# Patient Record
(demographics unavailable — no encounter records)

---

## 2024-11-01 NOTE — DISCUSSION/SUMMARY
[de-identified] : Left knee osteoarthritis with acute exacerbation.  I discussed the treatment of degenerative arthritis with the patient at length today. I described the spectrum of treatment from nonoperative modalities to total joint arthroplasty. Noninvasive and nonoperative treatment modalities include weight reduction, activity modification with low impact exercise, PRN use of acetaminophen or anti-inflammatory medication if tolerated, glucosamine/chondroitin supplements, and physical therapy. Further treatments can include corticosteroid injection and the use of hyaluronic acid injections. Definitive treatment can certainly include total joint arthroplasty also. The risks and benefits of each treatment options was discussed and all questions were answered. The patient is indicated for total knee arthroplasty. We discussed the surgery postoperative protocol restrictions in length. Risks benefits alternatives of surgery discussed including but not limited to risks such as bleeding infection nerve and vessel injury continued pain stiffness need for future surgery medical complications such as DVT or PE and anesthesia complications. We reviewed the plan of care and used a model of a knee replacement that will be be used for the joint replacement. We did discuss implant choice and fixation method with shared decision-making with myself and the patient. We discussed the use of cement fixation. We discussed discharge options and plan. The patient agrees with this plan of care as well these implants other total knee replacement good balance

## 2024-11-01 NOTE — PHYSICAL EXAM
[de-identified] : left knee exam  Skin: Clean, dry, intact Inspection: No obvious malalignment, no masses, no swelling, no effusion. Tenderness: + MJLT. No LJLT. No tenderness over the medial and lateral patella facets. No ttp medial/lateral epicondyle, patella tendon, tibial tubercle, pes anserinus, or proximal fibula. ROM: 0 to 140 no pain with deep flexion Stability: Stable to varus, valgus, lachman testing. Negative anterior/posterior drawer. Additional tests: Negative McMurrays test, Negative patellar grind test.  Strength: 5/5 Q/H/TA/GS/EHL, no atrophy Neuro: In tact to light touch throughout in dp/sp/tib/sidney/saph nerve districutions, DTR's normal Pulses: 2+ DP/PT pulses.  right knee exam    Skin: Clean, dry, intact Inspection: No obvious malalignment, no masses, no swelling, no effusion.  Tenderness: no MJLT. No LJLT. No tenderness over the medial and lateral patella facets. No ttp medial/lateral epicondyle, patella tendon, tibial tubercle, pes anserinus, or proximal fibula.  ROM: 0 to 140 no pain with deep flexion  Stability: Stable to varus, valgus, lachman testing. Negative anterior/posterior drawer.  Additional tests: Negative McMurrays test, Negative patellar grind test.   Strength: 5/5 Q/H/TA/GS/EHL, no atrophy  Neuro: In tact to light touch throughout in dp/sp/tib/sidney/saph nerve districutions,  DTR's normal Pulses: 2+ DP/PT pulses.  [de-identified] : Prior radiographs reviewed severe arthrosis medial joint space narrowing osteophyte sclerosis
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
## 2024-12-10 NOTE — HISTORY OF PRESENT ILLNESS
[de-identified] : Left Knee [de-identified] : 58 yo female s/p Left total knee arthroplasty, 11/25/2024.  She is overall doing well her pain is controlled she is walking with a cane she is doing home therapy waiting to start outpatient therapy tomorrow [de-identified] : Left knee exam Incisions are healing well no erythema Mild effusion Range of motion 3-70 Stable to anterior posterior varus valgus stress calf is soft and nontender Able to flex and extend all toes Sensation intact throughout brisk capillary refill. [de-identified] : Left total knee arthroplasty, 11/25/2024. [de-identified] : Staples removed and Steri-Strips were placed.  We reviewed postoperative protocol restrictions.  Start outpatient physical therapy.  She will follow-up in 1 month.  All questions were answered.  Continue aspirin for DVT prophylaxis

## 2025-01-10 NOTE — HISTORY OF PRESENT ILLNESS
[de-identified] : Left Knee [de-identified] : 60 yo female s/p Left total knee arthroplasty, 11/25/2024.  She is overall doing well she is walking with a cane she complains of burning pain on the medial aspect of her knee but not lateral she has hypersensitivity and has difficulty wearing pants because of this [de-identified] : Left knee exam Incisions are healing well no erythema hyperesthesia to the medial aspect of the incision Mild effusion Range of motion 2-95 Stable to anterior posterior varus valgus stress calf is soft and nontender Able to flex and extend all toes Sensation intact throughout brisk capillary refill. [de-identified] : Left total knee arthroplasty, 11/25/2024. [de-identified] : The following radiographs were ordered and read by me during this patients visit. I reviewed each radiograph in detail with the patient and discussed the findings as highlighted below. 3 views left knee were obtained today.  Well-positioned total knee replacement no evidence of loosening or fracture [de-identified] : She is now 6 weeks status post total knee arthroplasty.  She is having some nerve sensitivity to the medial aspect of her incision given prescription for lidocaine cream and lidocaine patches also started gabapentin side effects discussed she does have some mild stiffness especially terminal extension we discussed the importance of physical therapy focusing on terminal extension with downward pressure on her knee we get her close to terminal extension however she is resting at about 2 degrees short of terminal extension we discussed the importance of this.  She will follow-up in 6 weeks.  All questions were answered

## 2025-02-28 NOTE — PHYSICAL EXAM
[de-identified] : Left knee exam Incisions are healing well no erythema Mild effusion Range of motion 0-110 Hypersensitivity to touch along the medial incision Stable to anterior posterior varus and valgus stress calf is soft and nontender Able to flex and extend all toes Sensation intact throughout brisk capillary refill.

## 2025-02-28 NOTE — HISTORY OF PRESENT ILLNESS
[de-identified] : 58 yo female s/p Left total knee arthroplasty, 11/25/2024.  She is complaining of pain in her knee.  Hypersensitivity along the medial incision has not gone away.  She is working with physical therapy although she has had issues with insurance authorization she denies numbness and tingling

## 2025-02-28 NOTE — DISCUSSION/SUMMARY
[de-identified] : 60 yo female s/p Left total knee arthroplasty, 11/25/2024.  Additional physical therapy is medically necessary at this time given her stiffness pain and weakness.  She continue with lidocaine for the hypersensitivity.  We did discuss possibility of referral for nerve ablation if symptoms persist all questions answered she will follow-up 2 months

## 2025-04-30 NOTE — DISCUSSION/SUMMARY
[de-identified] : 6-month status post left total knee arthroplasty with continued hypersensitivity this is in the region of the saphenous nerve.  It is medial from the incision and not likely a neuroma from the incision itself although maybe there is a branch that is formed a neuroma from retractor placement in the medial knee.  Will arrange for diagnostic ultrasound possible injection into the neuroma based on ultrasound findings we discussed other possibilities such as cryoablation or nerve exploration she has not responded to topical lidocaine or gabapentin.  Will see response to the ultrasound.  All questions answered otherwise follow-up for annual checkup for total knee replacement

## 2025-04-30 NOTE — PHYSICAL EXAM
[de-identified] : Left knee exam Incisions are healing well no erythema Mild effusion Range of motion 0-110 Hypersensitivity to touch along the medial incision Stable to anterior posterior varus and valgus stress calf is soft and nontender Able to flex and extend all toes Sensation intact throughout brisk capillary refill.

## 2025-04-30 NOTE — HISTORY OF PRESENT ILLNESS
[de-identified] : 60 yo female s/p Left total knee arthroplasty, 11/25/2024.  She is complaining of pain in her knee.  Hypersensitivity along the medial incision has not gone away.  She has been going once a week for physical therapy she states her insurance has cut her off  she is paying out-of-pocket.  She is taking gabapentin without relief